# Patient Record
Sex: FEMALE | Race: BLACK OR AFRICAN AMERICAN | NOT HISPANIC OR LATINO | Employment: STUDENT | URBAN - METROPOLITAN AREA
[De-identification: names, ages, dates, MRNs, and addresses within clinical notes are randomized per-mention and may not be internally consistent; named-entity substitution may affect disease eponyms.]

---
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
## 2025-02-19 ENCOUNTER — OFFICE VISIT (OUTPATIENT)
Dept: URGENT CARE | Facility: CLINIC | Age: 25
End: 2025-02-19
Payer: COMMERCIAL

## 2025-02-19 VITALS
BODY MASS INDEX: 20.09 KG/M2 | HEART RATE: 90 BPM | WEIGHT: 140.31 LBS | OXYGEN SATURATION: 100 % | TEMPERATURE: 99 F | RESPIRATION RATE: 16 BRPM | SYSTOLIC BLOOD PRESSURE: 118 MMHG | HEIGHT: 70 IN | DIASTOLIC BLOOD PRESSURE: 55 MMHG

## 2025-02-19 DIAGNOSIS — K04.7 DENTAL ABSCESS: Primary | ICD-10-CM

## 2025-02-19 DIAGNOSIS — K05.219 GINGIVAL ABSCESS: ICD-10-CM

## 2025-02-19 DIAGNOSIS — R51.9 FACIAL PAIN: ICD-10-CM

## 2025-02-19 RX ORDER — CHLORHEXIDINE GLUCONATE ORAL RINSE 1.2 MG/ML
15 SOLUTION DENTAL 2 TIMES DAILY
Qty: 473 ML | Refills: 0 | Status: SHIPPED | OUTPATIENT
Start: 2025-02-19

## 2025-02-19 RX ORDER — KETOROLAC TROMETHAMINE 30 MG/ML
30 INJECTION, SOLUTION INTRAMUSCULAR; INTRAVENOUS
Status: COMPLETED | OUTPATIENT
Start: 2025-02-19 | End: 2025-02-19

## 2025-02-19 RX ORDER — IBUPROFEN 800 MG/1
800 TABLET ORAL 3 TIMES DAILY
Qty: 30 TABLET | Refills: 0 | Status: SHIPPED | OUTPATIENT
Start: 2025-02-19

## 2025-02-19 RX ORDER — AMOXICILLIN AND CLAVULANATE POTASSIUM 875; 125 MG/1; MG/1
1 TABLET, FILM COATED ORAL EVERY 12 HOURS
Qty: 20 TABLET | Refills: 0 | Status: SHIPPED | OUTPATIENT
Start: 2025-02-19 | End: 2025-03-01

## 2025-02-19 RX ORDER — AMOXICILLIN AND CLAVULANATE POTASSIUM 875; 125 MG/1; MG/1
1 TABLET, FILM COATED ORAL EVERY 12 HOURS
Qty: 14 TABLET | Refills: 0 | Status: SHIPPED | OUTPATIENT
Start: 2025-02-19 | End: 2025-02-19

## 2025-02-19 RX ADMIN — KETOROLAC TROMETHAMINE 30 MG: 30 INJECTION, SOLUTION INTRAMUSCULAR; INTRAVENOUS at 02:02

## 2025-02-19 NOTE — LETTER
February 19, 2025      [facility] [address] [place] [address]  Phone: [phone]  Fax: [phone]       Patient: Azul Mendez   YOB: 2000  Date of Visit: 02/19/2025    To Whom It May Concern:    Rai Mendez  was at [facility] on 02/19/2025. The patient may return to work/school on 02/20/2025 with no restrictions. If you have any questions or concerns, or if I can be of further assistance, please do not hesitate to contact me.    Sincerely,    Nicolasa Schwab PA-C

## 2025-02-19 NOTE — PATIENT INSTRUCTIONS
You received an injection of a powerful NSAID today - Toradol. Its effects will last up to 24 hours. Please do not take another NSAID (i.e. Aspirin, Ibuprofen, Aleve, Advil or Motrin) until this time tomorrow. If you continue to have pain, you may take Tylenol (acetaminophen) if you are not allergic to this medication.      You have been identified as having a health issues requiring the urgent care of a dentist. [facility], in conjunction with [facility] are offering free screening and treatment for acute dental problems for patients who qualify. Please contact their office to schedule an appointment.     [facility]  [address] [place] [address] (in the Pharmacy building)   Monday- Friday 8:30 am- 4:30 pm   Call [phone] or email [email or web address] to make your appointment  Please take all medications as prescribed.   Please complete your entire course of antibiotics as prescribed to ensure effectiveness.       Please arrange follow up with your primary medical clinic as soon as possible. You must understand that you've received an Urgent Care treatment only and that you may be released before all of your medical problems are known or treated. You, the patient, will arrange for follow up as instructed. If your symptoms worsen or fail to improve you should go to the Emergency Room.

## 2025-02-19 NOTE — PROGRESS NOTES
"Subjective:      Patient ID: Azul Mendez is a 24 y.o. female.    Vitals:  height is 5' 10" (1.778 m) and weight is 63.6 kg (140 lb 5.2 oz). Her temperature is 98.5 °F (36.9 °C). Her blood pressure is 118/55 (abnormal) and her pulse is 90. Her respiration is 16 and oxygen saturation is 100%.     Chief Complaint: Oral Pain    Azul Mendez is a 24 y.o. female who presents for oral pain which onset 2 days ago. Associated sxs include facial pain and swelling. She notes hx of infected tooth. States she does not have dental insurance here in [place] and has not been able to get in. Pain is 7/10 in severity. Patient denies any fever, chills, SOB, CP, abd pain, n/v/d, rash, dizziness, or numbness/tingling. Prior Tx includes ibuprofen and tylenol.      Oral Pain   This is a new problem. The current episode started in the past 7 days (Onset Monday). The problem occurs constantly. The problem has been gradually worsening. The pain is at a severity of 7/10. The pain is moderate. Associated symptoms include facial pain. Pertinent negatives include no difficulty swallowing, fever, oral bleeding, sinus pressure or thermal sensitivity. She has tried acetaminophen for the symptoms. The treatment provided mild relief.       Constitution: Negative for chills, sweating and fever.   HENT:  Positive for dental problem. Negative for sinus pressure.    Cardiovascular:  Negative for chest pain and palpitations.   Respiratory:  Negative for shortness of breath.    Gastrointestinal:  Negative for abdominal pain, nausea, vomiting and diarrhea.   Skin:  Negative for erythema.   Neurological:  Negative for dizziness, headaches, numbness and tingling.      Objective:     Physical Exam   Constitutional: She is oriented to person, place, and time. She appears well-developed.   HENT:   Head: Normocephalic and atraumatic. Head is without abrasion, without contusion and without laceration.       Ears:   Right Ear: External ear normal.   Left Ear: " External ear normal.   Nose: Nose normal.   Mouth/Throat: Uvula is midline, oropharynx is clear and moist and mucous membranes are normal. Dental abscesses and dental caries present. No posterior oropharyngeal edema or posterior oropharyngeal erythema.       L mandibular swelling and TTP. No warmth or erythema. No drainage.       Comments: L mandibular swelling and TTP. No warmth or erythema. No drainage.   Eyes: Conjunctivae, EOM and lids are normal. Pupils are equal, round, and reactive to light.   Neck: Trachea normal and phonation normal. Neck supple.   Cardiovascular: Normal rate, regular rhythm and normal heart sounds.   Pulmonary/Chest: Effort normal and breath sounds normal. No stridor. No respiratory distress.   Musculoskeletal: Normal range of motion.         General: Normal range of motion.   Neurological: She is alert and oriented to person, place, and time.   Skin: Skin is warm, dry, intact and no rash. Capillary refill takes less than 2 seconds. No abrasion, No burn, No bruising, No erythema and No ecchymosis   Psychiatric: Her speech is normal and behavior is normal. Judgment and thought content normal.   Nursing note and vitals reviewed.      Assessment:     1. Dental abscess    2. Facial pain    3. Gingival abscess        Plan:       Dental abscess  -     chlorhexidine (PERIDEX) 0.12 % solution; Use as directed 15 mLs in the mouth or throat 2 (two) times daily.  Dispense: 473 mL; Refill: 0  -     ketorolac injection 30 mg  -     amoxicillin-clavulanate 875-125mg (AUGMENTIN) 875-125 mg per tablet; Take 1 tablet by mouth every 12 (twelve) hours. for 10 days  Dispense: 20 tablet; Refill: 0  -     ibuprofen (ADVIL,MOTRIN) 800 MG tablet; Take 1 tablet (800 mg total) by mouth 3 (three) times daily.  Dispense: 30 tablet; Refill: 0    Facial pain  -     ketorolac injection 30 mg  -     ibuprofen (ADVIL,MOTRIN) 800 MG tablet; Take 1 tablet (800 mg total) by mouth 3 (three) times daily.  Dispense: 30 tablet;  Refill: 0    Gingival abscess    Other orders  -     Discontinue: amoxicillin-clavulanate 875-125mg (AUGMENTIN) 875-125 mg per tablet; Take 1 tablet by mouth every 12 (twelve) hours. for 7 days  Dispense: 14 tablet; Refill: 0      Afebrile. VSS.  Toradol IM given in clinic. No NSAIDs for 24 hours.  Meds: augmentin, ibuprofen, peridex sent to pharmacy  Recommend cool/warm compresses.  Patient will need to f/u with dentist as soon as possible. Dental information given.  RTC as needed.